# Patient Record
Sex: FEMALE | Race: WHITE | NOT HISPANIC OR LATINO | URBAN - METROPOLITAN AREA
[De-identification: names, ages, dates, MRNs, and addresses within clinical notes are randomized per-mention and may not be internally consistent; named-entity substitution may affect disease eponyms.]

---

## 2017-02-22 ENCOUNTER — COMMUNICATION - HEALTHEAST (OUTPATIENT)
Dept: TELEHEALTH | Facility: CLINIC | Age: 22
End: 2017-02-22

## 2017-02-22 ENCOUNTER — OFFICE VISIT - HEALTHEAST (OUTPATIENT)
Dept: FAMILY MEDICINE | Facility: CLINIC | Age: 22
End: 2017-02-22

## 2017-02-22 DIAGNOSIS — J02.0 STREP PHARYNGITIS: ICD-10-CM

## 2021-05-30 VITALS — WEIGHT: 120.2 LBS

## 2021-05-31 ENCOUNTER — RECORDS - HEALTHEAST (OUTPATIENT)
Dept: ADMINISTRATIVE | Facility: CLINIC | Age: 26
End: 2021-05-31

## 2021-06-09 NOTE — PROGRESS NOTES
ASSESSMENT/PLAN:       1. Strep pharyngitis  -Does have strep today on testing. Will treat accordingly. Ibuprofen and tylenol as needed for pain and discomfort. F/u as needed. No school/work for 24 hours.   -Given her prolonged symptoms, they should f/u next week if things are not getting remarkably better.   - Rapid Strep A Screen-Throat  - amoxicillin (AMOXIL) 500 MG tablet; Take 1 tablet (500 mg total) by mouth 2 (two) times a day for 10 days.  Dispense: 20 tablet; Refill: 0          Abbie Hunter MD      PROGRESS NOTE   2/22/2017    SUBJECTIVE:  Yashira Marvin is a 21 y.o. female  who presents for not feeling well.     She has been tired for the last few weeks. She seemed to have influenza last week and then started feeling a bit better. Last week she was achy, coughing and tired. 3 days ago she started feeling more swollen. Yesterday she had a sore throat, neck pain, aches. She is a  and going to paramedic school. She hasn't had the flu shot yet.   She has difficulty swallowing. She did have a slight temperature yesterday something. Her appetite is down, she does have some nausea. She does have a runny/stuffy nose now. No sinus pain. She is not coughing.     Everyone at home seemed to have similar symptoms last week, no one else has the sore throat now.       Chief Complaint   Patient presents with     Sore Throat     Patient states that about two weekends ago she had flu like symptoms (body aches, vomitting, headaches, nasal drainage and a slight fever). About three days ago patient started to have a sore throat, felt swollen and hard to swallow with neck pain and tiredness.          There is no problem list on file for this patient.      Current Outpatient Prescriptions   Medication Sig Dispense Refill     amoxicillin (AMOXIL) 500 MG tablet Take 1 tablet (500 mg total) by mouth 2 (two) times a day for 10 days. 20 tablet 0     No current facility-administered medications for this  visit.        History   Smoking Status     Never Smoker   Smokeless Tobacco     Not on file           OBJECTIVE:        Recent Results (from the past 240 hour(s))   Rapid Strep A Screen-Throat   Result Value Ref Range    Rapid Strep A Antigen Group A Strep detected (!) No Group A Strep detected       Vitals:    02/22/17 1022   BP: 102/60   Patient Site: Right Arm   Patient Position: Sitting   Cuff Size: Adult Regular   Pulse: 78   Temp: 98.3  F (36.8  C)   TempSrc: Oral   Weight: 120 lb 3.2 oz (54.5 kg)     Weight: 120 lb 3.2 oz (54.5 kg)        Physical Exam:  GENERAL APPEARANCE: A&A, NAD, well hydrated, well nourished  SKIN:  Normal skin turgor, no lesions/rashes   HEENT: moist mucous membranes, no rhinorrhea, sinuses are non-tender to palpation, TMs clear bilaterally, pharynx with petechiae, tonsillar enlargement and bilateral exudates  NECK: LAD present  CV: RRR, no M/G/R   LUNGS: CTAB  ABDOMEN: S&NT, no masses   EXTREMITY: no edema   NEURO: no gross deficits

## 2021-06-27 ENCOUNTER — HEALTH MAINTENANCE LETTER (OUTPATIENT)
Age: 26
End: 2021-06-27

## 2021-10-17 ENCOUNTER — HEALTH MAINTENANCE LETTER (OUTPATIENT)
Age: 26
End: 2021-10-17

## 2022-07-23 ENCOUNTER — HEALTH MAINTENANCE LETTER (OUTPATIENT)
Age: 27
End: 2022-07-23

## 2022-10-01 ENCOUNTER — HEALTH MAINTENANCE LETTER (OUTPATIENT)
Age: 27
End: 2022-10-01

## 2023-08-12 ENCOUNTER — HEALTH MAINTENANCE LETTER (OUTPATIENT)
Age: 28
End: 2023-08-12

## 2024-12-18 ENCOUNTER — LAB REQUISITION (OUTPATIENT)
Dept: LAB | Facility: CLINIC | Age: 29
End: 2024-12-18

## 2024-12-18 DIAGNOSIS — Z12.4 ENCOUNTER FOR SCREENING FOR MALIGNANT NEOPLASM OF CERVIX: ICD-10-CM

## 2024-12-18 PROCEDURE — G0145 SCR C/V CYTO,THINLAYER,RESCR: HCPCS | Performed by: OBSTETRICS & GYNECOLOGY

## 2024-12-23 LAB
BKR LAB AP GYN ADEQUACY: NORMAL
BKR LAB AP GYN INTERPRETATION: NORMAL
BKR LAB AP HPV REFLEX: NORMAL
BKR LAB AP LMP: NORMAL
BKR LAB AP PREVIOUS ABNL DX: NORMAL
BKR LAB AP PREVIOUS ABNORMAL: NORMAL
PATH REPORT.COMMENTS IMP SPEC: NORMAL
PATH REPORT.COMMENTS IMP SPEC: NORMAL
PATH REPORT.RELEVANT HX SPEC: NORMAL